# Patient Record
Sex: MALE | Race: WHITE | NOT HISPANIC OR LATINO | Employment: FULL TIME | URBAN - METROPOLITAN AREA
[De-identification: names, ages, dates, MRNs, and addresses within clinical notes are randomized per-mention and may not be internally consistent; named-entity substitution may affect disease eponyms.]

---

## 2018-02-23 ENCOUNTER — APPOINTMENT (OUTPATIENT)
Dept: GENERAL RADIOLOGY | Facility: HOSPITAL | Age: 53
End: 2018-02-23

## 2018-02-23 ENCOUNTER — HOSPITAL ENCOUNTER (EMERGENCY)
Facility: HOSPITAL | Age: 53
Discharge: HOME | End: 2018-02-23
Attending: EMERGENCY MEDICINE | Admitting: EMERGENCY MEDICINE

## 2018-02-23 ENCOUNTER — APPOINTMENT (OUTPATIENT)
Dept: CT IMAGING | Facility: HOSPITAL | Age: 53
End: 2018-02-23

## 2018-02-23 VITALS
RESPIRATION RATE: 16 BRPM | TEMPERATURE: 98.1 F | HEART RATE: 80 BPM | WEIGHT: 315 LBS | DIASTOLIC BLOOD PRESSURE: 86 MMHG | OXYGEN SATURATION: 94 % | SYSTOLIC BLOOD PRESSURE: 134 MMHG | HEIGHT: 69 IN | BODY MASS INDEX: 46.65 KG/M2

## 2018-02-23 DIAGNOSIS — R06.09 DYSPNEA ON EXERTION: Primary | ICD-10-CM

## 2018-02-23 LAB
ALBUMIN SERPL-MCNC: 3.9 G/DL (ref 3.5–5.2)
ALBUMIN/GLOB SERPL: 1.1 G/DL
ALP SERPL-CCNC: 85 U/L (ref 39–117)
ALT SERPL W P-5'-P-CCNC: 30 U/L (ref 1–41)
ANION GAP SERPL CALCULATED.3IONS-SCNC: 11.5 MMOL/L
AST SERPL-CCNC: 15 U/L (ref 1–40)
BASOPHILS # BLD AUTO: 0.06 10*3/MM3 (ref 0–0.2)
BASOPHILS NFR BLD AUTO: 0.5 % (ref 0–1.5)
BILIRUB SERPL-MCNC: 0.2 MG/DL (ref 0.1–1.2)
BUN BLD-MCNC: 21 MG/DL (ref 6–20)
BUN/CREAT SERPL: 25.9 (ref 7–25)
CALCIUM SPEC-SCNC: 9.4 MG/DL (ref 8.6–10.5)
CHLORIDE SERPL-SCNC: 103 MMOL/L (ref 98–107)
CO2 SERPL-SCNC: 27.5 MMOL/L (ref 22–29)
CREAT BLD-MCNC: 0.81 MG/DL (ref 0.76–1.27)
D DIMER PPP FEU-MCNC: 0.81 MCGFEU/ML (ref 0–0.49)
DEPRECATED RDW RBC AUTO: 48.6 FL (ref 37–54)
EOSINOPHIL # BLD AUTO: 0.28 10*3/MM3 (ref 0–0.7)
EOSINOPHIL NFR BLD AUTO: 2.4 % (ref 0.3–6.2)
ERYTHROCYTE [DISTWIDTH] IN BLOOD BY AUTOMATED COUNT: 13.5 % (ref 11.5–14.5)
GFR SERPL CREATININE-BSD FRML MDRD: 100 ML/MIN/1.73
GLOBULIN UR ELPH-MCNC: 3.5 GM/DL
GLUCOSE BLD-MCNC: 99 MG/DL (ref 65–99)
HCT VFR BLD AUTO: 44.2 % (ref 40.4–52.2)
HGB BLD-MCNC: 14.3 G/DL (ref 13.7–17.6)
HOLD SPECIMEN: NORMAL
HOLD SPECIMEN: NORMAL
IMM GRANULOCYTES # BLD: 0.05 10*3/MM3 (ref 0–0.03)
IMM GRANULOCYTES NFR BLD: 0.4 % (ref 0–0.5)
LYMPHOCYTES # BLD AUTO: 3.69 10*3/MM3 (ref 0.9–4.8)
LYMPHOCYTES NFR BLD AUTO: 31.1 % (ref 19.6–45.3)
MCH RBC QN AUTO: 32 PG (ref 27–32.7)
MCHC RBC AUTO-ENTMCNC: 32.4 G/DL (ref 32.6–36.4)
MCV RBC AUTO: 98.9 FL (ref 79.8–96.2)
MONOCYTES # BLD AUTO: 0.98 10*3/MM3 (ref 0.2–1.2)
MONOCYTES NFR BLD AUTO: 8.2 % (ref 5–12)
NEUTROPHILS # BLD AUTO: 6.82 10*3/MM3 (ref 1.9–8.1)
NEUTROPHILS NFR BLD AUTO: 57.4 % (ref 42.7–76)
NRBC BLD MANUAL-RTO: 0 /100 WBC (ref 0–0)
NT-PROBNP SERPL-MCNC: 20.5 PG/ML (ref 0–900)
PLATELET # BLD AUTO: 378 10*3/MM3 (ref 140–500)
PMV BLD AUTO: 10.7 FL (ref 6–12)
POTASSIUM BLD-SCNC: 4.3 MMOL/L (ref 3.5–5.2)
PROT SERPL-MCNC: 7.4 G/DL (ref 6–8.5)
RBC # BLD AUTO: 4.47 10*6/MM3 (ref 4.6–6)
SODIUM BLD-SCNC: 142 MMOL/L (ref 136–145)
TROPONIN T SERPL-MCNC: <0.01 NG/ML (ref 0–0.03)
TROPONIN T SERPL-MCNC: <0.01 NG/ML (ref 0–0.03)
WBC NRBC COR # BLD: 11.88 10*3/MM3 (ref 4.5–10.7)
WHOLE BLOOD HOLD SPECIMEN: NORMAL
WHOLE BLOOD HOLD SPECIMEN: NORMAL

## 2018-02-23 PROCEDURE — 71275 CT ANGIOGRAPHY CHEST: CPT

## 2018-02-23 PROCEDURE — 83880 ASSAY OF NATRIURETIC PEPTIDE: CPT | Performed by: EMERGENCY MEDICINE

## 2018-02-23 PROCEDURE — 0 IOPAMIDOL PER 1 ML: Performed by: EMERGENCY MEDICINE

## 2018-02-23 PROCEDURE — 36415 COLL VENOUS BLD VENIPUNCTURE: CPT | Performed by: EMERGENCY MEDICINE

## 2018-02-23 PROCEDURE — 99284 EMERGENCY DEPT VISIT MOD MDM: CPT

## 2018-02-23 PROCEDURE — 93010 ELECTROCARDIOGRAM REPORT: CPT | Performed by: INTERNAL MEDICINE

## 2018-02-23 PROCEDURE — 93005 ELECTROCARDIOGRAM TRACING: CPT

## 2018-02-23 PROCEDURE — 71046 X-RAY EXAM CHEST 2 VIEWS: CPT

## 2018-02-23 PROCEDURE — 85025 COMPLETE CBC W/AUTO DIFF WBC: CPT | Performed by: EMERGENCY MEDICINE

## 2018-02-23 PROCEDURE — 80053 COMPREHEN METABOLIC PANEL: CPT | Performed by: EMERGENCY MEDICINE

## 2018-02-23 PROCEDURE — 85379 FIBRIN DEGRADATION QUANT: CPT | Performed by: NURSE PRACTITIONER

## 2018-02-23 PROCEDURE — 84484 ASSAY OF TROPONIN QUANT: CPT | Performed by: EMERGENCY MEDICINE

## 2018-02-23 PROCEDURE — 94640 AIRWAY INHALATION TREATMENT: CPT

## 2018-02-23 RX ORDER — ALBUTEROL SULFATE 90 UG/1
2 AEROSOL, METERED RESPIRATORY (INHALATION) EVERY 6 HOURS PRN
Qty: 1 INHALER | Refills: 0 | Status: SHIPPED | OUTPATIENT
Start: 2018-02-23

## 2018-02-23 RX ORDER — LISINOPRIL 40 MG/1
40 TABLET ORAL DAILY
COMMUNITY

## 2018-02-23 RX ORDER — IPRATROPIUM BROMIDE AND ALBUTEROL SULFATE 2.5; .5 MG/3ML; MG/3ML
3 SOLUTION RESPIRATORY (INHALATION) ONCE
Status: COMPLETED | OUTPATIENT
Start: 2018-02-23 | End: 2018-02-23

## 2018-02-23 RX ORDER — FLUTICASONE PROPIONATE 50 MCG
2 SPRAY, SUSPENSION (ML) NASAL DAILY
COMMUNITY

## 2018-02-23 RX ORDER — SODIUM CHLORIDE 0.9 % (FLUSH) 0.9 %
10 SYRINGE (ML) INJECTION AS NEEDED
Status: DISCONTINUED | OUTPATIENT
Start: 2018-02-23 | End: 2018-02-23 | Stop reason: HOSPADM

## 2018-02-23 RX ADMIN — IPRATROPIUM BROMIDE AND ALBUTEROL SULFATE 3 ML: .5; 3 SOLUTION RESPIRATORY (INHALATION) at 15:32

## 2018-02-23 RX ADMIN — IOPAMIDOL 95 ML: 755 INJECTION, SOLUTION INTRAVENOUS at 18:38

## 2018-02-23 NOTE — ED TRIAGE NOTES
"Pt states that he has been having increasing shortness of breath over the past month.   States it is worse when he gets up and walks around also when he is driving.   Pt states \"I feel like someone is sitting on my throat, I am working really hard to breathe.\"  Pt states that he stopped smoking about a month ago.  "

## 2018-02-23 NOTE — ED PROVIDER NOTES
"EMERGENCY DEPARTMENT ENCOUNTER    CHIEF COMPLAINT  Chief Complaint: Dyspnea  History given by: Patient  History limited by: N/A  Room Number: 14/14  PMD: Mustapha Escalera MD      HPI:  Pt is a 52 y.o. male who presents to the ED c/o intermittent episodes of dyspnea onset about a month ago. Pt reports that his dyspnea worsens with exertion and improves with rest. Pt denies chest pain, palpitations, diaphoresis, vomiting, and abdominal pain, but reports that he has had a productive cough. Pt reports that he was recently seen at his PMD's office for the dyspnea and URI-like symptoms (cough and congestion); while at his PMD's office, pt was prescribed with rx for antibiotics to cover for bronchitis and an inhaler. Pt reports that he has completed the course of antibiotics and used the inhaler without significant sx relief. There are no other complaints at this time.     Duration: Onset about a month ago  Timing: Intermittent  Location: Respiratory   Radiation: N/A  Quality: \"difficulty breathing\"  Intensity/Severity: Moderate  Progression: Waxing and waning  Associated Symptoms: Productive cough, congestion   Aggravating Factors: Exertion  Alleviating Factors: Rest  Previous Episodes: Pt has had this intermittently for the past month   Treatment before arrival: Antibiotics, inhaler         PAST MEDICAL HISTORY  Active Ambulatory Problems     Diagnosis Date Noted   • No Active Ambulatory Problems     Resolved Ambulatory Problems     Diagnosis Date Noted   • No Resolved Ambulatory Problems     Past Medical History:   Diagnosis Date   • Hepatitis C infection    • Hypertension    • Seasonal allergies        PAST SURGICAL HISTORY  Past Surgical History:   Procedure Laterality Date   • FINGER SURGERY     • SPLENECTOMY         FAMILY HISTORY  History reviewed. No pertinent family history.    SOCIAL HISTORY  Social History     Social History   • Marital status:      Spouse name: N/A   • Number of children: N/A   • Years " of education: N/A     Occupational History   • Not on file.     Social History Main Topics   • Smoking status: Former Smoker     Packs/day: 1.00     Types: Cigarettes   • Smokeless tobacco: Never Used   • Alcohol use Yes      Comment: occ.   • Drug use: No   • Sexual activity: Defer     Other Topics Concern   • Not on file     Social History Narrative   • No narrative on file         ALLERGIES  Review of patient's allergies indicates no known allergies.        REVIEW OF SYSTEMS  Review of Systems   Constitutional: Negative for chills and diaphoresis.   HENT: Positive for congestion. Negative for rhinorrhea and sore throat.    Eyes: Negative for pain.   Respiratory: Positive for cough and shortness of breath.    Cardiovascular: Negative for chest pain, palpitations and leg swelling.   Gastrointestinal: Negative for abdominal pain, diarrhea, nausea and vomiting.   Genitourinary: Negative for difficulty urinating, dysuria, flank pain and frequency.   Musculoskeletal: Negative for myalgias, neck pain and neck stiffness.   Skin: Negative for rash.   Neurological: Negative for dizziness, speech difficulty, weakness, light-headedness, numbness and headaches.   Psychiatric/Behavioral: Negative.    All other systems reviewed and are negative.          PHYSICAL EXAM  ED Triage Vitals   Temp Heart Rate Resp BP SpO2   02/23/18 1141 02/23/18 1141 02/23/18 1141 02/23/18 1143 02/23/18 1141   98.1 °F (36.7 °C) 106 24 172/111 92 %      Temp src Heart Rate Source Patient Position BP Location FiO2 (%)   02/23/18 1141 -- -- -- --   Tympanic           Physical Exam   Constitutional: He is oriented to person, place, and time. No distress.   HENT:   Head: Normocephalic.   Mouth/Throat: Mucous membranes are normal.   Eyes: EOM are normal. Pupils are equal, round, and reactive to light.   Neck: Normal range of motion. Neck supple.   Cardiovascular: Normal rate, regular rhythm and normal heart sounds.    Pulmonary/Chest: Effort normal and  breath sounds normal. No respiratory distress. He has no decreased breath sounds. He has no wheezes. He has no rhonchi. He has no rales.   Abdominal: Soft. There is no tenderness. There is no rebound and no guarding.   Musculoskeletal: Normal range of motion.   Neurological: He is alert and oriented to person, place, and time. He has normal sensation.   Skin: Skin is warm and dry.   Psychiatric: Mood and affect normal.   Nursing note and vitals reviewed.          LAB RESULTS  Recent Results (from the past 24 hour(s))   Comprehensive Metabolic Panel    Collection Time: 02/23/18 12:10 PM   Result Value Ref Range    Glucose 99 65 - 99 mg/dL    BUN 21 (H) 6 - 20 mg/dL    Creatinine 0.81 0.76 - 1.27 mg/dL    Sodium 142 136 - 145 mmol/L    Potassium 4.3 3.5 - 5.2 mmol/L    Chloride 103 98 - 107 mmol/L    CO2 27.5 22.0 - 29.0 mmol/L    Calcium 9.4 8.6 - 10.5 mg/dL    Total Protein 7.4 6.0 - 8.5 g/dL    Albumin 3.90 3.50 - 5.20 g/dL    ALT (SGPT) 30 1 - 41 U/L    AST (SGOT) 15 1 - 40 U/L    Alkaline Phosphatase 85 39 - 117 U/L    Total Bilirubin 0.2 0.1 - 1.2 mg/dL    eGFR Non African Amer 100 >60 mL/min/1.73    Globulin 3.5 gm/dL    A/G Ratio 1.1 g/dL    BUN/Creatinine Ratio 25.9 (H) 7.0 - 25.0    Anion Gap 11.5 mmol/L   BNP    Collection Time: 02/23/18 12:10 PM   Result Value Ref Range    proBNP 20.5 0.0 - 900.0 pg/mL   Troponin    Collection Time: 02/23/18 12:10 PM   Result Value Ref Range    Troponin T <0.010 0.000 - 0.030 ng/mL   Green Top (Gel)    Collection Time: 02/23/18 12:10 PM   Result Value Ref Range    Extra Tube Hold for add-ons.    Lavender Top    Collection Time: 02/23/18 12:10 PM   Result Value Ref Range    Extra Tube hold for add-on    Gold Top - SST    Collection Time: 02/23/18 12:10 PM   Result Value Ref Range    Extra Tube Hold for add-ons.    CBC Auto Differential    Collection Time: 02/23/18 12:10 PM   Result Value Ref Range    WBC 11.88 (H) 4.50 - 10.70 10*3/mm3    RBC 4.47 (L) 4.60 - 6.00 10*6/mm3     Hemoglobin 14.3 13.7 - 17.6 g/dL    Hematocrit 44.2 40.4 - 52.2 %    MCV 98.9 (H) 79.8 - 96.2 fL    MCH 32.0 27.0 - 32.7 pg    MCHC 32.4 (L) 32.6 - 36.4 g/dL    RDW 13.5 11.5 - 14.5 %    RDW-SD 48.6 37.0 - 54.0 fl    MPV 10.7 6.0 - 12.0 fL    Platelets 378 140 - 500 10*3/mm3    Neutrophil % 57.4 42.7 - 76.0 %    Lymphocyte % 31.1 19.6 - 45.3 %    Monocyte % 8.2 5.0 - 12.0 %    Eosinophil % 2.4 0.3 - 6.2 %    Basophil % 0.5 0.0 - 1.5 %    Immature Grans % 0.4 0.0 - 0.5 %    Neutrophils, Absolute 6.82 1.90 - 8.10 10*3/mm3    Lymphocytes, Absolute 3.69 0.90 - 4.80 10*3/mm3    Monocytes, Absolute 0.98 0.20 - 1.20 10*3/mm3    Eosinophils, Absolute 0.28 0.00 - 0.70 10*3/mm3    Basophils, Absolute 0.06 0.00 - 0.20 10*3/mm3    Immature Grans, Absolute 0.05 (H) 0.00 - 0.03 10*3/mm3    nRBC 0.0 0.0 - 0.0 /100 WBC   Light Blue Top    Collection Time: 02/23/18 12:13 PM   Result Value Ref Range    Extra Tube hold for add-on        I ordered the above labs and reviewed the results        RADIOLOGY  XR Chest 2 View   Preliminary Result   No active disease is seen in the chest.               I ordered the above noted radiological studies and reviewed the images on the PACS system.              EKG    EKG was interpreted by Dr. Santoyo (ER physician). Please see his note for documentation.           PROGRESS AND CONSULTS    3:10 PM:  Duo-neb ordered to improve breathing.     3:25 PM:  Reviewed pt's history and workup with Dr. Santoyo (ER physician).  After a bedside evaluation, Dr. Santoyo agrees with the plan of care.    4:03 PM:  Dr. Santoyo would like a D-Dimer ordered for further evaluation -> ordered. Course of care was turned over to Dr. Santoyo pending pt's D-Dimer results and disposition.         COURSE & MEDICAL DECISION MAKING  Pertinent Labs and Imaging studies that were ordered and reviewed are noted above.  Results were reviewed/discussed with the patient and they were also made aware of online assess.   Pt also  "made aware that some labs, such as cultures, will not be resulted during ER visit and follow up with PMD is necessary.         MEDICATIONS GIVEN IN ER  Medications   sodium chloride 0.9 % flush 10 mL (not administered)   ipratropium-albuterol (DUO-NEB) nebulizer solution 3 mL (3 mL Nebulization Given 2/23/18 1532)       /93  Pulse 63  Temp 98.1 °F (36.7 °C) (Tympanic)   Resp 12  Ht 175.3 cm (69\")  Wt (!) 156 kg (345 lb)  SpO2 96%  BMI 50.95 kg/m2      DISPOSITION:  Course of care was turned over to Dr. Santoyo pending pt's D-Dimer results and disposition.       Documentation assistance provided by jailyn Villagomez for KATYA Varma.  Information recorded by the scribe was done at my direction and has been verified and validated by me.          Sonia Villagomez  02/23/18 160       KATYA Kumar  02/23/18 1608    "

## 2018-02-23 NOTE — ED PROVIDER NOTES
Pt presents complaining of exertional SOA onset about three months ago. Pt reports he recently stopped smoking about one month ago. He states he was taking a water pill about one one month ago but he has recently stopped taking it.     On exam:  Lungs: O2 stat is 99% on room air, CTAB    EKG           EKG time: 1217  Rhythm/Rate: 78, NSR  P waves and NV: nml  QRS, axis: borderline LAD   ST and T waves: nonspecific T wave changes  No acute process    Interpreted Contemporaneously by me, independently viewed  No prior for comparison.    1855 BP- 149/92 HR- 74 Temp- 98.1 °F (36.7 °C) (Tympanic) O2 sat- 94%. Rechecked the patient who is in NAD and is resting comfortably. Informed pt of negative CTA Chest and plan to discharge. Pt understands and agrees with treatment. All questions and concerns were addressed at this time.    I reviewed pt's lab and imaging results. He reports his symptoms have improved after the nebulizer treatment. Will discharge. Pt understands and agrees with the plan. All questions answered.    ED Course   Comment By Time   7:26 PM  The patient has been stable in no distress.  His never had any chest pain with the shortness of breath.  His shortness of breath has been occurring for several months since at least November 2017.  Occurs with exertion never has chest pain with exertion.  He has smoked his whole life and just stopped smoking approximately 1 month ago.  Breathing treatment prior to my arrival resolved his dyspnea.  His lab work EKG CT scan and chest x-ray are all unremarkable.  I believe the likely etiology of this is related to his significant smoking history and probably has a component of COPD as well as potentially pulmonary hypertension and right heart failure.  He has had progressive weight gain and edema to his lower extremities over the past several years.  He was on Lasix and he stopped it about one year ago.  I think a cardiac source primarily is less likely.  I'm referring him  to cardiology as well as pulmonology for further follow-up.  I think he probably has sleep apnea as well.  I inform the patient to return if he has worsening of his symptoms develop any pain, specifically chest pain, or any fever.  Patient is comfortable with this plan. Charli Santoyo MD 02/23 1929       I supervised care provided by the midlevel provider.    We have discussed this patient's history, physical exam, and treatment plan.   I have reviewed the note and personally saw and examined the patient and agree with the plan of care. Documentation assistance provided by jailyn Tdaeo for Dr. Santoyo.  Information recorded by the scribe was done at my direction and has been verified and validated by me.        Chino Tadeo  02/23/18 0572       Charli Santoyo MD  02/23/18 2952

## 2018-02-27 ENCOUNTER — OFFICE VISIT (OUTPATIENT)
Dept: CARDIOLOGY | Facility: CLINIC | Age: 53
End: 2018-02-27

## 2018-02-27 VITALS
BODY MASS INDEX: 46.65 KG/M2 | DIASTOLIC BLOOD PRESSURE: 82 MMHG | SYSTOLIC BLOOD PRESSURE: 132 MMHG | WEIGHT: 315 LBS | HEIGHT: 69 IN | HEART RATE: 82 BPM

## 2018-02-27 DIAGNOSIS — I10 BENIGN ESSENTIAL HTN: Chronic | ICD-10-CM

## 2018-02-27 DIAGNOSIS — R06.02 SOB (SHORTNESS OF BREATH): Primary | ICD-10-CM

## 2018-02-27 DIAGNOSIS — E66.01 MORBID OBESITY WITH BODY MASS INDEX (BMI) OF 50.0 TO 59.9 IN ADULT (HCC): ICD-10-CM

## 2018-02-27 PROCEDURE — 93000 ELECTROCARDIOGRAM COMPLETE: CPT | Performed by: INTERNAL MEDICINE

## 2018-02-27 PROCEDURE — 99203 OFFICE O/P NEW LOW 30 MIN: CPT | Performed by: INTERNAL MEDICINE

## 2018-02-27 NOTE — PROGRESS NOTES
Subjective:     Encounter Date:02/27/2018      Patient ID: Matt Ellis is a 52 y.o. male.    Chief Complaint:  History of Present Illness    Dear Dr. Escalera,    I had the pleasure of seeing this patient in the office today for initial evaluation and consultation.  He was referred here from the emergency room.    Patient was seen there 2 days ago with complaints of shortness of breath.  He's been having shortness breath lately.  He also falls asleep very easily.  He states that anytime that he sits down if there is not something that distracts him that a follow-up sleep.  He informs me that he is already scheduled to see pulmonary and it he has been told that he likely has sleep apnea.  He states that he was afraid that he was can be late this morning he said down to put on his socks and then he fell asleep while he was putting his socks on.  He woke up and then had to rush to get her on time.    He was referred here from the emergency room for performance of a transthoracic echocardiogram.  We went to the emergency room he complained of some lower extremity edema.  He had a CT scan performed there that showed no pulmonary emboli and no interstitial infiltrates.  BNP was normal at 20.    Patient does note that when he ambulates he gets some shortness of breath; he notes this almost exclusively at work.  He has no orthopnea or PND.  He is not had any chills or fevers.  Denies a cough.  He denies any chest pain, pressure, tightness, squeezing, or heartburn.This patient has no known cardiac history.  This patient has no history of coronary artery disease, congestive heart failure, rheumatic fever, rheumatic heart disease, congenital heart disease or heart murmur.  This patient has never required invasive cardiovascular evaluation.        The following portions of the patient's history were reviewed and updated as appropriate: allergies, current medications, past family history, past medical history, past social  history, past surgical history and problem list.    Past Medical History:   Diagnosis Date   • COPD (chronic obstructive pulmonary disease)    • LÓPEZ (dyspnea on exertion)    • Hepatitis C infection     No longer has this per pt   • Hypertension    • Seasonal allergies    • SOBOE (shortness of breath on exertion)        Past Surgical History:   Procedure Laterality Date   • FINGER SURGERY     • SPLENECTOMY         Social History     Social History   • Marital status:      Spouse name: N/A   • Number of children: N/A   • Years of education: N/A     Occupational History   • Not on file.     Social History Main Topics   • Smoking status: Former Smoker     Packs/day: 1.00     Types: Cigarettes   • Smokeless tobacco: Never Used   • Alcohol use Yes      Comment: occasional   • Drug use: No   • Sexual activity: Defer     Other Topics Concern   • Not on file     Social History Narrative       Review of Systems   Constitution: Negative for chills, decreased appetite, fever and night sweats.   HENT: Negative for ear discharge, ear pain, hearing loss, nosebleeds and sore throat.    Eyes: Negative for blurred vision, double vision and pain.   Cardiovascular: Negative for cyanosis.   Respiratory: Negative for hemoptysis and sputum production.    Endocrine: Negative for cold intolerance and heat intolerance.   Hematologic/Lymphatic: Negative for adenopathy.   Skin: Negative for dry skin, itching, nail changes, rash and suspicious lesions.   Musculoskeletal: Negative for arthritis, gout, muscle cramps, muscle weakness, myalgias and neck pain.   Gastrointestinal: Negative for anorexia, bowel incontinence, constipation, diarrhea, dysphagia, hematemesis and jaundice.   Genitourinary: Negative for bladder incontinence, dysuria, flank pain, frequency, hematuria and nocturia.   Neurological: Negative for focal weakness, numbness, paresthesias and seizures.   Psychiatric/Behavioral: Negative for altered mental status,  "hallucinations, hypervigilance, suicidal ideas and thoughts of violence.   Allergic/Immunologic: Negative for persistent infections.         ECG 12 Lead  Date/Time: 2/27/2018 9:24 AM  Performed by: ANTIONE DOVE III.  Authorized by: ANTIONE DOVE III   Comparison: compared with previous ECG   Similar to previous ECG  Rhythm: sinus rhythm  Rate: normal  Conduction: conduction normal  ST Segments: ST segments normal  T Waves: T waves normal  QRS axis: normal  Other: no other findings  Clinical impression: normal ECG               Objective:     Vitals:    02/27/18 0912   BP: 132/82   Pulse: 82   Weight: (!) 165 kg (364 lb)   Height: 175.3 cm (69\")         Physical Exam   Constitutional: He is oriented to person, place, and time. He appears well-developed and well-nourished. No distress.   HENT:   Head: Normocephalic and atraumatic.   Nose: Nose normal.   Mouth/Throat: Oropharynx is clear and moist.   Eyes: Conjunctivae and EOM are normal. Pupils are equal, round, and reactive to light. Right eye exhibits no discharge. Left eye exhibits no discharge.   Neck: Normal range of motion. Neck supple. No tracheal deviation present. No thyromegaly present.   Cardiovascular: Normal rate, regular rhythm, S1 normal, S2 normal, normal heart sounds and normal pulses.  Exam reveals no S3.    Pulmonary/Chest: Effort normal and breath sounds normal. No stridor. No respiratory distress. He exhibits no tenderness.   Abdominal: Soft. Bowel sounds are normal. He exhibits no distension and no mass. There is no tenderness. There is no rebound and no guarding.   Musculoskeletal: Normal range of motion. He exhibits edema. He exhibits no tenderness or deformity.   Lymphadenopathy:     He has no cervical adenopathy.   Neurological: He is alert and oriented to person, place, and time. He has normal reflexes.   Skin: Skin is warm and dry. No rash noted. He is not diaphoretic. No erythema.   Psychiatric: He has a normal mood and affect. Thought " content normal.       Lab Review:     Results from last 7 days  Lab Units 02/23/18  1210   SODIUM mmol/L 142   POTASSIUM mmol/L 4.3   CHLORIDE mmol/L 103   CO2 mmol/L 27.5   BUN mg/dL 21*   CREATININE mg/dL 0.81   GLUCOSE mg/dL 99   CALCIUM mg/dL 9.4           Performed        Assessment:          Diagnosis Plan   1. SOB (shortness of breath)  ECG 12 Lead    Adult Transthoracic Echo Complete W/ Cont if Necessary Per Protocol   2. Morbid obesity with body mass index (BMI) of 50.0 to 59.9 in adult     3. Benign essential HTN            Plan:       1.  Dyspnea on exertion-an echocardiogram will be obtained  2.  Morbid obesity  3.  Agree the patient likely has sleep apnea.  Patient is scheduled to be seen by pulmonary soon, we'll defer further evaluation to them  4.  Hypertension-continue current medical therapy    Thank you very much for allowing us to participate in the care of this pleasant patient.  Please don't hesitate to call if I can be of assistance in any way.      Current Outpatient Prescriptions:   •  albuterol (PROVENTIL HFA;VENTOLIN HFA) 108 (90 Base) MCG/ACT inhaler, Inhale 2 puffs Every 6 (Six) Hours As Needed for Wheezing., Disp: 1 inhaler, Rfl: 0  •  fluticasone (FLONASE) 50 MCG/ACT nasal spray, 2 sprays into each nostril Daily., Disp: , Rfl:   •  lisinopril (PRINIVIL,ZESTRIL) 40 MG tablet, Take 40 mg by mouth Daily., Disp: , Rfl:   •  loratadine-pseudoephedrine (CLARITIN-D 12 HOUR) 5-120 MG per 12 hr tablet, Take 1 tablet by mouth 2 (Two) Times a Day., Disp: , Rfl:

## 2018-02-28 ENCOUNTER — HOSPITAL ENCOUNTER (EMERGENCY)
Facility: HOSPITAL | Age: 53
End: 2018-02-28

## 2018-02-28 ENCOUNTER — HOSPITAL ENCOUNTER (OUTPATIENT)
Dept: CARDIOLOGY | Facility: HOSPITAL | Age: 53
Discharge: HOME | End: 2018-02-28
Attending: INTERNAL MEDICINE | Admitting: INTERNAL MEDICINE

## 2018-02-28 VITALS
BODY MASS INDEX: 46.65 KG/M2 | HEART RATE: 84 BPM | HEIGHT: 69 IN | DIASTOLIC BLOOD PRESSURE: 100 MMHG | WEIGHT: 315 LBS | SYSTOLIC BLOOD PRESSURE: 130 MMHG

## 2018-02-28 DIAGNOSIS — R06.02 SOB (SHORTNESS OF BREATH): ICD-10-CM

## 2018-02-28 LAB
ASCENDING AORTA: 3.7 CM
BH CV ECHO MEAS - ACS: 2.6 CM
BH CV ECHO MEAS - AO MAX PG (FULL): 2.5 MMHG
BH CV ECHO MEAS - AO MAX PG: 6.3 MMHG
BH CV ECHO MEAS - AO MEAN PG (FULL): 1.3 MMHG
BH CV ECHO MEAS - AO MEAN PG: 3.8 MMHG
BH CV ECHO MEAS - AO ROOT AREA (BSA CORRECTED): 1.5
BH CV ECHO MEAS - AO ROOT AREA: 13.3 CM^2
BH CV ECHO MEAS - AO ROOT DIAM: 4.1 CM
BH CV ECHO MEAS - AO V2 MAX: 126 CM/SEC
BH CV ECHO MEAS - AO V2 MEAN: 92.1 CM/SEC
BH CV ECHO MEAS - AO V2 VTI: 29.8 CM
BH CV ECHO MEAS - BSA(HAYCOCK): 2.9 M^2
BH CV ECHO MEAS - BSA: 2.7 M^2
BH CV ECHO MEAS - BZI_BMI: 53.9 KILOGRAMS/M^2
BH CV ECHO MEAS - BZI_METRIC_HEIGHT: 175 CM
BH CV ECHO MEAS - BZI_METRIC_WEIGHT: 165.1 KG
BH CV ECHO MEAS - EDV(TEICH): 156.5 ML
BH CV ECHO MEAS - EF(CUBED): 44.9 %
BH CV ECHO MEAS - EF(TEICH): 36.9 %
BH CV ECHO MEAS - ESV(TEICH): 98.7 ML
BH CV ECHO MEAS - FS: 18 %
BH CV ECHO MEAS - IVS/LVPW: 1.1
BH CV ECHO MEAS - IVSD: 1.2 CM
BH CV ECHO MEAS - LAT PEAK E' VEL: 10 CM/SEC
BH CV ECHO MEAS - LV MASS(C)D: 274.3 GRAMS
BH CV ECHO MEAS - LV MASS(C)DI: 103.1 GRAMS/M^2
BH CV ECHO MEAS - LV MAX PG: 3.9 MMHG
BH CV ECHO MEAS - LV MEAN PG: 2.5 MMHG
BH CV ECHO MEAS - LV V1 MAX: 98.6 CM/SEC
BH CV ECHO MEAS - LV V1 MEAN: 75.2 CM/SEC
BH CV ECHO MEAS - LV V1 VTI: 21.7 CM
BH CV ECHO MEAS - LVIDD: 5.6 CM
BH CV ECHO MEAS - LVIDS: 4.6 CM
BH CV ECHO MEAS - LVPWD: 1.1 CM
BH CV ECHO MEAS - MED PEAK E' VEL: 8 CM/SEC
BH CV ECHO MEAS - MR MAX PG: 12.8 MMHG
BH CV ECHO MEAS - MR MAX VEL: 179.2 CM/SEC
BH CV ECHO MEAS - MV A DUR: 0.13 SEC
BH CV ECHO MEAS - MV A MAX VEL: 87.8 CM/SEC
BH CV ECHO MEAS - MV DEC SLOPE: 407 CM/SEC^2
BH CV ECHO MEAS - MV DEC TIME: 0.14 SEC
BH CV ECHO MEAS - MV E MAX VEL: 64 CM/SEC
BH CV ECHO MEAS - MV E/A: 0.73
BH CV ECHO MEAS - MV MAX PG: 3.4 MMHG
BH CV ECHO MEAS - MV MEAN PG: 1.4 MMHG
BH CV ECHO MEAS - MV P1/2T MAX VEL: 66.9 CM/SEC
BH CV ECHO MEAS - MV P1/2T: 48.2 MSEC
BH CV ECHO MEAS - MV V2 MAX: 91.5 CM/SEC
BH CV ECHO MEAS - MV V2 MEAN: 55.3 CM/SEC
BH CV ECHO MEAS - MV V2 VTI: 20.2 CM
BH CV ECHO MEAS - MVA P1/2T LCG: 3.3 CM^2
BH CV ECHO MEAS - MVA(P1/2T): 4.6 CM^2
BH CV ECHO MEAS - PA MAX PG: 4.5 MMHG
BH CV ECHO MEAS - PA V2 MAX: 105.9 CM/SEC
BH CV ECHO MEAS - PULM A REVS DUR: 0.1 SEC
BH CV ECHO MEAS - PULM A REVS VEL: 27.6 CM/SEC
BH CV ECHO MEAS - PULM DIAS VEL: 28 CM/SEC
BH CV ECHO MEAS - PULM S/D: 1.2
BH CV ECHO MEAS - PULM SYS VEL: 32.9 CM/SEC
BH CV ECHO MEAS - RAP SYSTOLE: 3 MMHG
BH CV ECHO MEAS - RVSP: 17 MMHG
BH CV ECHO MEAS - SI(AO): 149.4 ML/M^2
BH CV ECHO MEAS - SI(CUBED): 30.3 ML/M^2
BH CV ECHO MEAS - SI(TEICH): 21.7 ML/M^2
BH CV ECHO MEAS - SUP REN AO DIAM: 1.5 CM
BH CV ECHO MEAS - SV(AO): 397.7 ML
BH CV ECHO MEAS - SV(CUBED): 80.8 ML
BH CV ECHO MEAS - SV(TEICH): 57.8 ML
BH CV ECHO MEAS - TAPSE (>1.6): 2.8 CM2
BH CV ECHO MEAS - TR MAX VEL: 183.5 CM/SEC
BH CV XLRA - RV BASE: 3.3 CM
BH CV XLRA - TDI S': 16 CM/SEC
E/E' RATIO: 8
LEFT ATRIUM VOLUME INDEX: 18 ML/M2
LV EF 2D ECHO EST: 55 %
SINUS: 3.7 CM
STJ: 3.5 CM

## 2018-02-28 PROCEDURE — 93306 TTE W/DOPPLER COMPLETE: CPT

## 2018-02-28 PROCEDURE — 93306 TTE W/DOPPLER COMPLETE: CPT | Performed by: INTERNAL MEDICINE

## 2018-03-02 ENCOUNTER — TELEPHONE (OUTPATIENT)
Dept: CARDIOLOGY | Facility: CLINIC | Age: 53
End: 2018-03-02

## 2018-03-02 NOTE — TELEPHONE ENCOUNTER
Pt is returning your call re of his echo test result. Pt can be reached at [phone].....Melody BOYD

## 2018-03-06 NOTE — TELEPHONE ENCOUNTER
Pt is returning your phone call to go over his echo results. He is off Wednesday (3/7/18). Pt can be reached at [phone].    Thanks Michelle

## 2018-03-07 NOTE — TELEPHONE ENCOUNTER
Called that number ([phone]) that is his sister's cell #.  Called his other listed #- [phone]- [phone]

## 2018-03-09 NOTE — TELEPHONE ENCOUNTER
Pt called back. He can be reached at [phone] today to go over his echo results.    Thanks Michelle

## 2018-03-10 ENCOUNTER — APPOINTMENT (OUTPATIENT)
Dept: GENERAL RADIOLOGY | Facility: HOSPITAL | Age: 53
End: 2018-03-10

## 2018-03-10 ENCOUNTER — HOSPITAL ENCOUNTER (EMERGENCY)
Facility: HOSPITAL | Age: 53
Discharge: HOME | End: 2018-03-10
Attending: EMERGENCY MEDICINE | Admitting: EMERGENCY MEDICINE

## 2018-03-10 VITALS
OXYGEN SATURATION: 95 % | SYSTOLIC BLOOD PRESSURE: 126 MMHG | DIASTOLIC BLOOD PRESSURE: 91 MMHG | WEIGHT: 315 LBS | TEMPERATURE: 97.9 F | HEIGHT: 69 IN | BODY MASS INDEX: 46.65 KG/M2 | RESPIRATION RATE: 16 BRPM | HEART RATE: 79 BPM

## 2018-03-10 DIAGNOSIS — R06.02 SHORTNESS OF BREATH: Primary | ICD-10-CM

## 2018-03-10 LAB
ALBUMIN SERPL-MCNC: 4.2 G/DL (ref 3.5–5.2)
ALBUMIN/GLOB SERPL: 1.2 G/DL
ALP SERPL-CCNC: 82 U/L (ref 39–117)
ALT SERPL W P-5'-P-CCNC: 26 U/L (ref 1–41)
ANION GAP SERPL CALCULATED.3IONS-SCNC: 13.5 MMOL/L
ARTERIAL PATENCY WRIST A: POSITIVE
AST SERPL-CCNC: 17 U/L (ref 1–40)
ATMOSPHERIC PRESS: 748.9 MMHG
BASE EXCESS BLDA CALC-SCNC: 4.7 MMOL/L (ref 0–2)
BASOPHILS # BLD AUTO: 0.1 10*3/MM3 (ref 0–0.2)
BASOPHILS NFR BLD AUTO: 0.7 % (ref 0–1.5)
BDY SITE: ABNORMAL
BILIRUB SERPL-MCNC: 0.2 MG/DL (ref 0.1–1.2)
BUN BLD-MCNC: 22 MG/DL (ref 6–20)
BUN/CREAT SERPL: 23.2 (ref 7–25)
CALCIUM SPEC-SCNC: 10 MG/DL (ref 8.6–10.5)
CHLORIDE SERPL-SCNC: 100 MMOL/L (ref 98–107)
CO2 SERPL-SCNC: 28.5 MMOL/L (ref 22–29)
CREAT BLD-MCNC: 0.95 MG/DL (ref 0.76–1.27)
DEPRECATED RDW RBC AUTO: 48.1 FL (ref 37–54)
EOSINOPHIL # BLD AUTO: 0.37 10*3/MM3 (ref 0–0.7)
EOSINOPHIL NFR BLD AUTO: 2.6 % (ref 0.3–6.2)
ERYTHROCYTE [DISTWIDTH] IN BLOOD BY AUTOMATED COUNT: 13.3 % (ref 11.5–14.5)
GFR SERPL CREATININE-BSD FRML MDRD: 83 ML/MIN/1.73
GLOBULIN UR ELPH-MCNC: 3.4 GM/DL
GLUCOSE BLD-MCNC: 115 MG/DL (ref 65–99)
HCO3 BLDA-SCNC: 30.3 MMOL/L (ref 22–28)
HCT VFR BLD AUTO: 45.2 % (ref 40.4–52.2)
HGB BLD-MCNC: 14.7 G/DL (ref 13.7–17.6)
HOLD SPECIMEN: NORMAL
HOLD SPECIMEN: NORMAL
IMM GRANULOCYTES # BLD: 0.04 10*3/MM3 (ref 0–0.03)
IMM GRANULOCYTES NFR BLD: 0.3 % (ref 0–0.5)
LYMPHOCYTES # BLD AUTO: 4.45 10*3/MM3 (ref 0.9–4.8)
LYMPHOCYTES NFR BLD AUTO: 31.2 % (ref 19.6–45.3)
MCH RBC QN AUTO: 32.3 PG (ref 27–32.7)
MCHC RBC AUTO-ENTMCNC: 32.5 G/DL (ref 32.6–36.4)
MCV RBC AUTO: 99.3 FL (ref 79.8–96.2)
MODALITY: ABNORMAL
MONOCYTES # BLD AUTO: 1.11 10*3/MM3 (ref 0.2–1.2)
MONOCYTES NFR BLD AUTO: 7.8 % (ref 5–12)
NEUTROPHILS # BLD AUTO: 8.19 10*3/MM3 (ref 1.9–8.1)
NEUTROPHILS NFR BLD AUTO: 57.4 % (ref 42.7–76)
NT-PROBNP SERPL-MCNC: 22.4 PG/ML (ref 5–900)
PCO2 BLDA: 46.8 MM HG (ref 35–45)
PH BLDA: 7.42 PH UNITS (ref 7.35–7.45)
PLATELET # BLD AUTO: 385 10*3/MM3 (ref 140–500)
PMV BLD AUTO: 11.1 FL (ref 6–12)
PO2 BLDA: 76.5 MM HG (ref 80–100)
POTASSIUM BLD-SCNC: 4.1 MMOL/L (ref 3.5–5.2)
PROT SERPL-MCNC: 7.6 G/DL (ref 6–8.5)
RBC # BLD AUTO: 4.55 10*6/MM3 (ref 4.6–6)
SAO2 % BLDCOA: 95.2 % (ref 92–99)
SODIUM BLD-SCNC: 142 MMOL/L (ref 136–145)
TOTAL RATE: 18 BREATHS/MINUTE
TROPONIN T SERPL-MCNC: <0.01 NG/ML (ref 0–0.03)
WBC NRBC COR # BLD: 14.26 10*3/MM3 (ref 4.5–10.7)
WHOLE BLOOD HOLD SPECIMEN: NORMAL
WHOLE BLOOD HOLD SPECIMEN: NORMAL

## 2018-03-10 PROCEDURE — 93010 ELECTROCARDIOGRAM REPORT: CPT | Performed by: INTERNAL MEDICINE

## 2018-03-10 PROCEDURE — 83880 ASSAY OF NATRIURETIC PEPTIDE: CPT | Performed by: EMERGENCY MEDICINE

## 2018-03-10 PROCEDURE — 80053 COMPREHEN METABOLIC PANEL: CPT | Performed by: EMERGENCY MEDICINE

## 2018-03-10 PROCEDURE — 36600 WITHDRAWAL OF ARTERIAL BLOOD: CPT

## 2018-03-10 PROCEDURE — 93005 ELECTROCARDIOGRAM TRACING: CPT | Performed by: EMERGENCY MEDICINE

## 2018-03-10 PROCEDURE — 94640 AIRWAY INHALATION TREATMENT: CPT

## 2018-03-10 PROCEDURE — 82803 BLOOD GASES ANY COMBINATION: CPT

## 2018-03-10 PROCEDURE — 84484 ASSAY OF TROPONIN QUANT: CPT | Performed by: EMERGENCY MEDICINE

## 2018-03-10 PROCEDURE — 85025 COMPLETE CBC W/AUTO DIFF WBC: CPT | Performed by: EMERGENCY MEDICINE

## 2018-03-10 PROCEDURE — 93005 ELECTROCARDIOGRAM TRACING: CPT

## 2018-03-10 PROCEDURE — 99284 EMERGENCY DEPT VISIT MOD MDM: CPT

## 2018-03-10 PROCEDURE — 71046 X-RAY EXAM CHEST 2 VIEWS: CPT

## 2018-03-10 RX ORDER — IPRATROPIUM BROMIDE AND ALBUTEROL SULFATE 2.5; .5 MG/3ML; MG/3ML
3 SOLUTION RESPIRATORY (INHALATION) ONCE
Status: COMPLETED | OUTPATIENT
Start: 2018-03-10 | End: 2018-03-10

## 2018-03-10 RX ORDER — LISINOPRIL 40 MG/1
40 TABLET ORAL
COMMUNITY
End: 2018-03-10

## 2018-03-10 RX ADMIN — IPRATROPIUM BROMIDE AND ALBUTEROL SULFATE 3 ML: .5; 3 SOLUTION RESPIRATORY (INHALATION) at 20:07

## 2018-03-11 NOTE — ED PROVIDER NOTES
EMERGENCY DEPARTMENT ENCOUNTER    CHIEF COMPLAINT  Chief Complaint: Shortness of AIr  History given by: Patient  History limited by: none  Room Number: 04/04  PMD: Mustapha Escalera MD      HPI:  Pt is a 52 y.o. male who presents complaining of SOA that been worsening over the past month. Pt confirms headache and neck pain that began today, and he began productive cough with green exudate last night, as well as fever and chills. Pt states he has not yet seen pulmonologist. Pt denies COPD and asthma, but states he has smoked most of his life and stopped 1 month ago.     Duration:  1 month  Onset: gradual  Timing: constant  Location: chest  Radiation: none  Quality: SOA  Intensity/Severity: moderate  Progression: worsening  Associated Symptoms: cough, fever, chills, headache, and neck pain  Aggravating Factors: none  Alleviating Factors: none  Previous Episodes: Pt was seen in ED within the month for similar symptoms.  Treatment before arrival: none    PAST MEDICAL HISTORY  Active Ambulatory Problems     Diagnosis Date Noted   • SOB (shortness of breath) 02/27/2018   • Morbid obesity with body mass index (BMI) of 50.0 to 59.9 in adult 02/27/2018   • Benign essential HTN 02/27/2018     Resolved Ambulatory Problems     Diagnosis Date Noted   • No Resolved Ambulatory Problems     Past Medical History:   Diagnosis Date   • Benign essential HTN 2/27/2018   • COPD (chronic obstructive pulmonary disease)    • LÓPEZ (dyspnea on exertion)    • Hepatitis C infection    • Hypertension    • Seasonal allergies    • SOBOE (shortness of breath on exertion)        PAST SURGICAL HISTORY  Past Surgical History:   Procedure Laterality Date   • FINGER SURGERY     • SPLENECTOMY         FAMILY HISTORY  Family History   Problem Relation Age of Onset   • Cancer Mother    • Hypertension Father    • Stroke Maternal Grandmother    • Heart disease Maternal Grandfather 65       SOCIAL HISTORY  Social History     Social History   • Marital status:       Spouse name: N/A   • Number of children: N/A   • Years of education: N/A     Occupational History   • Not on file.     Social History Main Topics   • Smoking status: Former Smoker     Packs/day: 1.00     Types: Cigarettes   • Smokeless tobacco: Never Used   • Alcohol use Yes      Comment: occasional   • Drug use: No   • Sexual activity: Defer     Other Topics Concern   • Not on file     Social History Narrative   • No narrative on file       ALLERGIES  Review of patient's allergies indicates no known allergies.    REVIEW OF SYSTEMS  Review of Systems   Constitutional: Positive for chills and fever. Negative for activity change and appetite change.   HENT: Negative for congestion and sore throat.    Eyes: Negative.    Respiratory: Positive for cough (productive, green exudate) and shortness of breath.    Cardiovascular: Negative for chest pain and leg swelling.   Gastrointestinal: Negative for abdominal pain, diarrhea and vomiting.   Endocrine: Negative.    Genitourinary: Negative for decreased urine volume and dysuria.   Musculoskeletal: Positive for neck pain.   Skin: Negative for rash and wound.   Allergic/Immunologic: Negative.    Neurological: Positive for headaches. Negative for weakness and numbness.   Hematological: Negative.    Psychiatric/Behavioral: Negative.    All other systems reviewed and are negative.      PHYSICAL EXAM  ED Triage Vitals   Temp Heart Rate Resp BP SpO2   03/10/18 1913 03/10/18 1913 03/10/18 1913 03/10/18 1921 03/10/18 1913   97.5 °F (36.4 °C) 102 20 (!) 137/106 93 %      Temp src Heart Rate Source Patient Position BP Location FiO2 (%)   03/10/18 1913 03/10/18 1913 03/10/18 1921 03/10/18 1921 --   Tympanic Monitor Sitting Left arm        Physical Exam   Constitutional: He is oriented to person, place, and time and well-developed, well-nourished, and in no distress.   Pt has intermittent pursed lip breathing.    HENT:   Head: Normocephalic and atraumatic.   Eyes: EOM are  normal. Pupils are equal, round, and reactive to light.   Neck: Normal range of motion. Neck supple.   Cardiovascular: Normal rate, regular rhythm and normal heart sounds.    Pulmonary/Chest: Effort normal. No respiratory distress. He has decreased breath sounds in the right lower field and the left lower field.   Abdominal: Soft. There is no tenderness. There is no rebound and no guarding.   Pt is obese   Musculoskeletal: Normal range of motion. He exhibits edema (trace in BLE). He exhibits no tenderness (no calf).   Neurological: He is alert and oriented to person, place, and time. He has normal sensation and normal strength.   Skin: Skin is warm and dry.   Psychiatric: Mood and affect normal.   Nursing note and vitals reviewed.      LAB RESULTS  Lab Results (last 24 hours)     Procedure Component Value Units Date/Time    CBC & Differential [[id number]] Collected:  03/10/18 1930    Specimen:  Blood Updated:  03/10/18 2008    Narrative:       The following orders were created for panel order CBC & Differential.  Procedure                               Abnormality         Status                     ---------                               -----------         ------                     CBC Auto Differential[[id number]]        Abnormal            Final result                 Please view results for these tests on the individual orders.    Comprehensive Metabolic Panel [[id number]]  (Abnormal) Collected:  03/10/18 1930    Specimen:  Blood Updated:  03/10/18 2014     Glucose 115 (H) mg/dL      BUN 22 (H) mg/dL      Creatinine 0.95 mg/dL      Sodium 142 mmol/L      Potassium 4.1 mmol/L      Chloride 100 mmol/L      CO2 28.5 mmol/L      Calcium 10.0 mg/dL      Total Protein 7.6 g/dL      Albumin 4.20 g/dL      ALT (SGPT) 26 U/L      AST (SGOT) 17 U/L      Alkaline Phosphatase 82 U/L      Total Bilirubin 0.2 mg/dL      eGFR Non African Amer 83 mL/min/1.73      Globulin 3.4 gm/dL      A/G Ratio 1.2 g/dL      BUN/Creatinine  Ratio 23.2     Anion Gap 13.5 mmol/L     BNP [[id number]]  (Normal) Collected:  03/10/18 1930    Specimen:  Blood Updated:  03/10/18 2015     proBNP 22.4 pg/mL     Narrative:       Among patients with dyspnea, NT-proBNP is highly sensitive for the detection of acute congestive heart failure. In addition NT-proBNP of <300 pg/ml effectively rules out acute congestive heart failure with 99% negative predictive value.    Troponin [[id number]]  (Normal) Collected:  03/10/18 1930    Specimen:  Blood Updated:  03/10/18 2015     Troponin T <0.010 ng/mL     Narrative:       Troponin T Reference Ranges:  Less than 0.03 ng/mL:    Negative for AMI  0.03 to 0.09 ng/mL:      Indeterminant for AMI  Greater than 0.09 ng/mL: Positive for AMI    CBC Auto Differential [[id number]]  (Abnormal) Collected:  03/10/18 1930    Specimen:  Blood Updated:  03/10/18 2008     WBC 14.26 (H) 10*3/mm3      RBC 4.55 (L) 10*6/mm3      Hemoglobin 14.7 g/dL      Hematocrit 45.2 %      MCV 99.3 (H) fL      MCH 32.3 pg      MCHC 32.5 (L) g/dL      RDW 13.3 %      RDW-SD 48.1 fl      MPV 11.1 fL      Platelets 385 10*3/mm3      Neutrophil % 57.4 %      Lymphocyte % 31.2 %      Monocyte % 7.8 %      Eosinophil % 2.6 %      Basophil % 0.7 %      Immature Grans % 0.3 %      Neutrophils, Absolute 8.19 (H) 10*3/mm3      Lymphocytes, Absolute 4.45 10*3/mm3      Monocytes, Absolute 1.11 10*3/mm3      Eosinophils, Absolute 0.37 10*3/mm3      Basophils, Absolute 0.10 10*3/mm3      Immature Grans, Absolute 0.04 (H) 10*3/mm3     Blood Gas, Arterial [[id number]]  (Abnormal) Collected:  03/10/18 2003    Specimen:  Arterial Blood Updated:  03/10/18 2007     Site Arterial: left radial     Soren's Test Positive     pH, Arterial 7.419 pH units      pCO2, Arterial 46.8 (H) mm Hg      pO2, Arterial 76.5 (L) mm Hg      HCO3, Arterial 30.3 (H) mmol/L      Base Excess, Arterial 4.7 (H) mmol/L      O2 Saturation Calculated 95.2 %      Barometric Pressure for Blood Gas 748.9 mmHg       Modality Room Air     Rate 18 Breaths/minute     Narrative:       SAT 93% Meter: [id number] : [id number] Dave Leslie          I ordered the above labs and reviewed the results    RADIOLOGY  XR Chest 2 View   Final Result   No acute findings.           This report was finalized on 3/10/2018 8:15 PM by Dr. Charo Huynh MD.               I ordered the above noted radiological studies. Interpreted by radiologist. Reviewed by me in PACS.       PROCEDURES  Procedures  EKG           EKG time: 1925  Rhythm/Rate: SR 83  P waves and MT: nml  QRS, axis: left axis deviation   ST and T waves: nonspecific T wave changes     Interpreted Contemporaneously by me, independently viewed  Unchanged compared to prior 2/23/18      PROGRESS AND CONSULTS  ED Course     1943  Labs and CXR ordered for further evaluation.     2207  Pt rechecked and resting comfortably. Discussed negative results of labs and CXR, with plan for discharge and follow up for sleep study with cardiologist. Upon recheck, pt's oropharynx was benign. Pt understands and agrees with plan, all questions addressed.   O2: 95%  HR: 84  BP: 123/98      MEDICAL DECISION MAKING  Results were reviewed/discussed with the patient and they were also made aware of online access. Pt also made aware that some labs, such as cultures, will not be resulted during ER visit and follow up with PMD is necessary.     MDM  Number of Diagnoses or Management Options  Shortness of breath:   Diagnosis management comments: Patient's workup was unremarkable except for mildly elevated white blood cell count.  He was given a nebulizer treatment.  Chest x-ray was negative.  Patient did have some pursed lip breathing while in the ER.  He is morbidly obese.  There was no evidence of congestive heart failure or pneumonia.  Patient will be discharged.       Amount and/or Complexity of Data Reviewed  Clinical lab tests: ordered and reviewed (WBC - 14.26  Troponin - negative)  Tests in the  radiology section of CPT®: ordered and reviewed (CXR - no acute findings)  Tests in the medicine section of CPT®: ordered and reviewed (See note)  Decide to obtain previous medical records or to obtain history from someone other than the patient: yes  Review and summarize past medical records: yes (2/23/18 - seen in ED for SOA, CTA chest showed mild lingular atelectasis.   2/27/18 - saw cardiologist for initial evaluation. Had echo done which showed normal LV systolic function and grade 1 dyastolic dysfunction, no evidence of valve disease. )           DIAGNOSIS  Final diagnoses:   Shortness of breath       DISPOSITION  DISCHARGE    Patient discharged in stable condition.    Reviewed implications of results, diagnosis, meds, responsibility to follow up, warning signs and symptoms of possible worsening, potential complications and reasons to return to ER.    Patient/Family voiced understanding of above instructions.    Discussed plan for discharge, as there is no emergent indication for admission.  Pt/family is agreeable and understands need for follow up and repeat testing.  Pt is aware that discharge does not mean that nothing is wrong but it indicates no emergency is present that requires admission and they must continue care with follow-up as given below or physician of their choice.     FOLLOW-UP  Mustapha Escalera MD  [address] [place] [address]  [phone]    Schedule an appointment as soon as possible for a visit            Medication List      No changes were made to your prescriptions during this visit.           Latest Documented Vital Signs:  As of 10:19 PM  BP- 114/96 HR- 81 Temp- 97.3 °F (36.3 °C) (Oral) O2 sat- 90%    --  Documentation assistance provided by jailyn Geronimo for Dr. Parikh.  Information recorded by the jailyn was done at my direction and has been verified and validated by me.            Melody Geronimo  03/10/18 [id number]       Mook Parikh MD  03/10/18 [id number]

## 2018-03-11 NOTE — DISCHARGE INSTRUCTIONS
Follow-up with your primary care doctor.  Have your sleep study done as previously recommended.  Return to emergency department for worsening shortness of breath, fever, chest pain, or other concern.  Continue using your home medications.